# Patient Record
Sex: MALE | Race: WHITE | NOT HISPANIC OR LATINO | Employment: FULL TIME | URBAN - METROPOLITAN AREA
[De-identification: names, ages, dates, MRNs, and addresses within clinical notes are randomized per-mention and may not be internally consistent; named-entity substitution may affect disease eponyms.]

---

## 2017-08-07 ENCOUNTER — TELEPHONE (OUTPATIENT)
Dept: INTERNAL MEDICINE | Facility: CLINIC | Age: 42
End: 2017-08-07

## 2017-08-07 NOTE — TELEPHONE ENCOUNTER
----- Message from NADJA Valdez sent at 8/7/2017 12:52 PM EDT -----  I will give him a 30 day supply and make a FU appt with him.   ----- Message -----     From: Maria R Valladares MA     Sent: 8/7/2017  10:32 AM       To: NADJA Valdez     You have not seen this pt since   2013,  Jasper radford last seen him in 2014  He has been told several times to make appt do you want to refill this??   ----- Message -----     From: Kirstin Agudelo MA     Sent: 8/7/2017  10:12 AM       To: Maria R Valladares MA        ----- Message -----     From: Rhonda Sanchez     Sent: 8/7/2017  10:05 AM       To: [facility] Clinical Pool    PATIENT NEEDS A REFILL ON ZOLOFT FROM [facility] IN [place] AND HASN'T BEEN SEEN SINCE 2014.  I SET HIM AN APPT FOR 8/23/17 AT 1:30, BUT HIS MEDS RUN OUT IN 3 DAYS.      [phone]      Pt aware   Sent to pharmacy  Pt has already scheduled appt

## 2017-08-23 ENCOUNTER — OFFICE VISIT (OUTPATIENT)
Dept: INTERNAL MEDICINE | Facility: CLINIC | Age: 42
End: 2017-08-23

## 2017-08-23 VITALS
OXYGEN SATURATION: 97 % | DIASTOLIC BLOOD PRESSURE: 74 MMHG | HEIGHT: 70 IN | WEIGHT: 218 LBS | BODY MASS INDEX: 31.21 KG/M2 | SYSTOLIC BLOOD PRESSURE: 122 MMHG | HEART RATE: 71 BPM

## 2017-08-23 DIAGNOSIS — F41.1 GENERALIZED ANXIETY DISORDER: Primary | ICD-10-CM

## 2017-08-23 DIAGNOSIS — J30.1 SEASONAL ALLERGIC RHINITIS DUE TO POLLEN: ICD-10-CM

## 2017-08-23 PROBLEM — F41.9 ANXIETY DISORDER: Status: ACTIVE | Noted: 2017-08-23

## 2017-08-23 PROCEDURE — 99213 OFFICE O/P EST LOW 20 MIN: CPT | Performed by: NURSE PRACTITIONER

## 2017-08-23 RX ORDER — FLUTICASONE PROPIONATE 50 MCG
2 SPRAY, SUSPENSION (ML) NASAL DAILY
Qty: 1 EACH | Refills: 11 | Status: SHIPPED | OUTPATIENT
Start: 2017-08-23 | End: 2017-09-22

## 2017-08-23 NOTE — PROGRESS NOTES
Chief Complaint   Patient presents with   • Anxiety       Subjective     Perez Beasley is a 41 y.o. male being seen for a follow up appointment today regarding  TORSTEN. He has been taking Zoloft for several years, and the current dose is stable. He denies panic attacks. He is sleeping well. 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     History of Present Illness     No Known Allergies      Current Outpatient Prescriptions:   •  sertraline (ZOLOFT) 50 MG tablet, Take 1 tablet by mouth Daily., Disp: 30 tablet, Rfl: 0    The following portions of the patient's history were reviewed and updated as appropriate: allergies, current medications, past family history, past medical history, past social history, past surgical history and problem list.    Review of Systems   Constitutional: Negative.    HENT: Negative.    Eyes: Negative.    Respiratory: Negative.    Cardiovascular: Negative.  Negative for chest pain and leg swelling.   Gastrointestinal: Negative.    Endocrine: Negative.    Musculoskeletal: Positive for arthralgias (knee).   Allergic/Immunologic: Positive for environmental allergies.   Neurological: Negative.    Psychiatric/Behavioral: The patient is nervous/anxious.        Assessment     Physical Exam   Constitutional: He is oriented to person, place, and time. He appears well-developed and well-nourished.   HENT:   Head: Normocephalic.   Right Ear: External ear normal.   Left Ear: External ear normal.   Nose: Mucosal edema present.   Mouth/Throat: Oropharynx is clear and moist. No oropharyngeal exudate.   Neck: Neck supple. No thyromegaly present.   Cardiovascular: Normal rate, regular rhythm and normal heart sounds.    No murmur heard.  Pulmonary/Chest: Effort normal and breath sounds normal. No respiratory distress. He has no wheezes.   Musculoskeletal: He exhibits no edema.   Neurological: He is alert and oriented to person, place, and time.   Psychiatric: He has a normal mood and affect. His behavior is normal.    Vitals reviewed.      Plan      Diagnosis Plan   1. Generalized anxiety disorder  sertraline (ZOLOFT) 50 MG tablet   2. Seasonal allergic rhinitis due to pollen  fluticasone (FLONASE) 50 MCG/ACT nasal spray

## 2018-05-25 ENCOUNTER — OFFICE VISIT (OUTPATIENT)
Dept: ORTHOPEDIC SURGERY | Facility: CLINIC | Age: 43
End: 2018-05-25

## 2018-05-25 VITALS — WEIGHT: 231 LBS | BODY MASS INDEX: 33.07 KG/M2 | TEMPERATURE: 97.6 F | HEIGHT: 70 IN

## 2018-05-25 DIAGNOSIS — M54.50 LOW BACK PAIN RADIATING TO RIGHT LEG: Primary | ICD-10-CM

## 2018-05-25 DIAGNOSIS — G89.29 CHRONIC LOW BACK PAIN, UNSPECIFIED BACK PAIN LATERALITY, WITH SCIATICA PRESENCE UNSPECIFIED: ICD-10-CM

## 2018-05-25 DIAGNOSIS — M54.5 CHRONIC LOW BACK PAIN, UNSPECIFIED BACK PAIN LATERALITY, WITH SCIATICA PRESENCE UNSPECIFIED: ICD-10-CM

## 2018-05-25 DIAGNOSIS — M79.604 LOW BACK PAIN RADIATING TO RIGHT LEG: Primary | ICD-10-CM

## 2018-05-25 PROCEDURE — 72100 X-RAY EXAM L-S SPINE 2/3 VWS: CPT | Performed by: ORTHOPAEDIC SURGERY

## 2018-05-25 PROCEDURE — 99204 OFFICE O/P NEW MOD 45 MIN: CPT | Performed by: ORTHOPAEDIC SURGERY

## 2018-05-25 NOTE — PROGRESS NOTES
New patient or new problem visit    Chief Complaint   Patient presents with   • Lumbar Spine - Establish Care, Pain       HPI: He complains of low back pain ongoing for years which radiates the right lower extremity which is gone on about 5 months.  Pain before was never so severe.  He's tried physical therapy and chiropractic the first epidural didn't help the second helped somewhat.  The pain is moderate to severe constant stabbing aching burning worse with activity.  He was training for triathlons and has had to cease this activity.    PFSH: See chart- reviewed    Review of Systems    PE: Constitutional: Vital signs above-noted.  Awake, alert and oriented    Psychiatric: Affect and insight do not appear grossly disturbed.    Pulmonary: Breathing is unlabored, color is good.    Skin: Warm, dry and normal turgor    Cardiac:  pedal pulses intact.  No edema.    Eyesight and hearing appear adequate for examination purposes      Musculoskeletal:  There is some tenderness to percussion and palpation of the spine. Motion appears undisturbed.  Posture is unremarkable to coronal and sagittal inspection.    The skin about the area is intact.  There is no palpable or visible deformity.  There is no local spasm.       Neurologic:   Reflexes are 2+ and symmetrical in the patellae and achilles.   Motor function is undisturbed in quadriceps, EHL, and gastrocnemius on the left and everything on the right is fine but may have slight weakness in EHL on the right   Sensation appears symmetrically intact to light touch   .  In the bilateral lower extremities there is no evidence of atrophy.   Clonus is absent..  Gait appears undisturbed.  SLR test negative      MEDICAL DECISION MAKING    XRAY: MRI scan of the lumbar spine shows T2 diminished signal intensity change at L4 5 and L5-S1.  Small broad-based disc protrusion with central herniation at L4 5 which does not cause neurologic impingement is noted along with the minimal disc  "irregularity at L5-S1.  The rest of the disc are well preserved.    Other: n/a    Impression: Lumbar disc degeneration    Plan: Right now I recommend he try to live with this and talk to Dr. Espinosa at the pain clinic to see if there are any other alternatives.  Pretty much exhausted conservative treatment.  I told him I would avoid narcotic pain medication, pain pumps or stimulators or \"laser surgery\".  I told him major surgical intervention might help but that would transfer stress to other levels, would not be guaranteed to help his pain, and in any event with unlikely to be covered by insurance.  All in all I told him to avoid surgery try to live with it and I don't think that anything catastrophic will come that even if he does elect to continue aggressive the physical activity such as triathlons etc.  "

## 2018-11-24 DIAGNOSIS — F41.1 GENERALIZED ANXIETY DISORDER: ICD-10-CM

## 2018-11-28 ENCOUNTER — TELEPHONE (OUTPATIENT)
Dept: INTERNAL MEDICINE | Facility: CLINIC | Age: 43
End: 2018-11-28

## 2018-11-28 DIAGNOSIS — F41.1 GENERALIZED ANXIETY DISORDER: ICD-10-CM

## 2018-11-29 ENCOUNTER — TELEPHONE (OUTPATIENT)
Dept: INTERNAL MEDICINE | Facility: CLINIC | Age: 43
End: 2018-11-29

## 2018-11-29 DIAGNOSIS — F41.1 GENERALIZED ANXIETY DISORDER: ICD-10-CM

## 2018-11-29 NOTE — TELEPHONE ENCOUNTER
Sent to pharmacy   Pt aware      ----- Message from NADJA Valdez sent at 11/29/2018  7:31 AM EST -----  Regarding: RE: script denial   Contact: [phone]  Okay for 30 d refill until appt    ----- Message -----  From: Maria R Valladares MA  Sent: 11/29/2018   6:55 AM  To: NADAJ Valdez  Subject: FW: script denial                                    ----- Message -----  From: Za Cohn MA  Sent: 11/28/2018  10:43 AM  To: Maria R Valladares MA  Subject: FW: script denial                                    ----- Message -----  From: Myrtle Murillo  Sent: 11/28/2018   9:54 AM  To: [facility] [place]  Subject: script denial                                    [facility]    PT MADE APPT FOR Thursday December 6TH.  CAN HE GET ENOUGH TO GET HIM TO APPT

## 2018-11-30 DIAGNOSIS — Z00.00 ROUTINE ADULT HEALTH MAINTENANCE: Primary | ICD-10-CM

## 2018-11-30 DIAGNOSIS — Z13.29 SCREENING FOR THYROID DISORDER: ICD-10-CM

## 2018-11-30 LAB
ALBUMIN SERPL-MCNC: 4.5 G/DL (ref 3.5–5.2)
ALBUMIN/GLOB SERPL: 1.9 G/DL
ALP SERPL-CCNC: 55 U/L (ref 40–129)
ALT SERPL-CCNC: 17 U/L (ref 5–41)
AST SERPL-CCNC: 18 U/L (ref 5–40)
BASOPHILS # BLD AUTO: 0.03 10*3/MM3 (ref 0–0.2)
BASOPHILS NFR BLD AUTO: 0.8 % (ref 0–2)
BILIRUB SERPL-MCNC: 0.3 MG/DL (ref 0.2–1.2)
BUN SERPL-MCNC: 21 MG/DL (ref 6–20)
BUN/CREAT SERPL: 17.6 (ref 7–25)
CALCIUM SERPL-MCNC: 9.3 MG/DL (ref 8.6–10.5)
CHLORIDE SERPL-SCNC: 101 MMOL/L (ref 98–107)
CHOLEST SERPL-MCNC: 219 MG/DL (ref 0–200)
CO2 SERPL-SCNC: 26.6 MMOL/L (ref 22–29)
CREAT SERPL-MCNC: 1.19 MG/DL (ref 0.76–1.27)
EOSINOPHIL # BLD AUTO: 0.08 10*3/MM3 (ref 0.1–0.3)
EOSINOPHIL NFR BLD AUTO: 2.2 % (ref 0–4)
ERYTHROCYTE [DISTWIDTH] IN BLOOD BY AUTOMATED COUNT: 12.3 % (ref 11.5–14.5)
GLOBULIN SER CALC-MCNC: 2.4 GM/DL
GLUCOSE SERPL-MCNC: 97 MG/DL (ref 65–99)
HCT VFR BLD AUTO: 42 % (ref 42–52)
HDLC SERPL-MCNC: 45 MG/DL (ref 40–60)
HGB BLD-MCNC: 13.7 G/DL (ref 14–18)
IMM GRANULOCYTES # BLD: 0 10*3/MM3 (ref 0–0.03)
IMM GRANULOCYTES NFR BLD: 0 % (ref 0–0.5)
LDLC SERPL CALC-MCNC: 137 MG/DL (ref 0–100)
LDLC/HDLC SERPL: 3.04 {RATIO}
LYMPHOCYTES # BLD AUTO: 1.02 10*3/MM3 (ref 0.6–4.8)
LYMPHOCYTES NFR BLD AUTO: 27.5 % (ref 20–45)
MCH RBC QN AUTO: 30.4 PG (ref 27–31)
MCHC RBC AUTO-ENTMCNC: 32.6 G/DL (ref 31–37)
MCV RBC AUTO: 93.1 FL (ref 80–94)
MONOCYTES # BLD AUTO: 0.42 10*3/MM3 (ref 0–1)
MONOCYTES NFR BLD AUTO: 11.3 % (ref 3–8)
NEUTROPHILS # BLD AUTO: 2.16 10*3/MM3 (ref 1.5–8.3)
NEUTROPHILS NFR BLD AUTO: 58.2 % (ref 45–70)
NRBC BLD AUTO-RTO: 0 /100 WBC (ref 0–0)
PLATELET # BLD AUTO: 237 10*3/MM3 (ref 140–500)
POTASSIUM SERPL-SCNC: 4.5 MMOL/L (ref 3.5–5.2)
PROT SERPL-MCNC: 6.9 G/DL (ref 6–8.5)
RBC # BLD AUTO: 4.51 10*6/MM3 (ref 4.7–6.1)
SODIUM SERPL-SCNC: 141 MMOL/L (ref 136–145)
TRIGL SERPL-MCNC: 186 MG/DL (ref 0–150)
TSH SERPL DL<=0.005 MIU/L-ACNC: 2.48 MIU/ML (ref 0.27–4.2)
VLDLC SERPL CALC-MCNC: 37.2 MG/DL (ref 8–32)
WBC # BLD AUTO: 3.71 10*3/MM3 (ref 4.8–10.8)

## 2018-12-06 ENCOUNTER — OFFICE VISIT (OUTPATIENT)
Dept: INTERNAL MEDICINE | Facility: CLINIC | Age: 43
End: 2018-12-06

## 2018-12-06 VITALS
HEIGHT: 70 IN | SYSTOLIC BLOOD PRESSURE: 122 MMHG | RESPIRATION RATE: 16 BRPM | BODY MASS INDEX: 31.07 KG/M2 | TEMPERATURE: 99.1 F | WEIGHT: 217 LBS | DIASTOLIC BLOOD PRESSURE: 82 MMHG | OXYGEN SATURATION: 96 % | HEART RATE: 65 BPM

## 2018-12-06 DIAGNOSIS — L30.9 ECZEMA, UNSPECIFIED TYPE: ICD-10-CM

## 2018-12-06 DIAGNOSIS — D64.9 ANEMIA, UNSPECIFIED TYPE: ICD-10-CM

## 2018-12-06 DIAGNOSIS — F41.1 GENERALIZED ANXIETY DISORDER: Primary | ICD-10-CM

## 2018-12-06 LAB
IRON SATN MFR SERPL: 21 %
IRON SERPL-MCNC: 66 MCG/DL (ref 59–158)
Lab: NORMAL
TIBC SERPL-MCNC: 307 MCG/DL (ref 261–498)
UIBC SERPL-MCNC: 241 MCG/DL (ref 112–346)
VIT B12 SERPL-MCNC: 335 PG/ML
WRITTEN AUTHORIZATION: NORMAL

## 2018-12-06 PROCEDURE — 99213 OFFICE O/P EST LOW 20 MIN: CPT | Performed by: NURSE PRACTITIONER

## 2018-12-06 NOTE — PROGRESS NOTES
Anxiety and anemia    Subjective     Perez Beasley is a 43 y.o. male being seen for a follow up appointment today regarding  Anxiety. He has taken Zoloft for several years, and it has controlled symptoms. He is a triathlete, working full time at [employer]. He denies panic attacks, insomnia, agitation, depression symptoms.     He had herniated a disc in May 2018, and gained weight due to inability to exercise. 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 He denies any rectal bleeding or recent blood loss. He has been taking aleve for knee pain.     He also has had a rash for 2 weeks to neck, which he thought was fungal and used OTC antifungal cream with no help. Denies exposure, denies itching. Precipitated by cold weather. No relieving factors. Worse after wearing collared shirts.     History of Present Illness     No Known Allergies      Current Outpatient Medications:   •  sertraline (ZOLOFT) 50 MG tablet, Take 1 tablet by mouth Daily., Disp: 30 tablet, Rfl: 0    The following portions of the patient's history were reviewed and updated as appropriate: allergies, current medications, past family history, past medical history, past social history, past surgical history and problem list.    Review of Systems   Constitutional: Negative.    HENT: Negative.    Eyes: Negative.  Negative for photophobia and visual disturbance.   Respiratory: Negative.  Negative for shortness of breath, wheezing and stridor.    Cardiovascular: Negative.    Gastrointestinal: Negative.    Endocrine: Negative.  Negative for heat intolerance.   Genitourinary: Negative.    Musculoskeletal: Negative.    Skin: Positive for rash.        Left neck   Allergic/Immunologic: Negative.  Negative for environmental allergies and food allergies.   Neurological: Negative.  Negative for dizziness.   Hematological: Negative.  Negative for adenopathy.   Psychiatric/Behavioral: Negative.        Assessment     Physical Exam   Constitutional: He is  oriented to person, place, and time. He appears well-developed and well-nourished.   HENT:   Head: Normocephalic and atraumatic.   Right Ear: External ear normal.   Left Ear: External ear normal.   Nose: Nose normal.   Mouth/Throat: Oropharynx is clear and moist. No oropharyngeal exudate (no pallor).   Neck: Neck supple.   Cardiovascular: Normal rate, regular rhythm and normal heart sounds.   No murmur heard.  Pulmonary/Chest: Effort normal and breath sounds normal. No stridor. No respiratory distress.   Musculoskeletal: He exhibits no edema.   Neurological: He is alert and oriented to person, place, and time.   Skin: Skin is warm and dry.   Erythematous, scaling rash to Left neck. Nonpruritic, without vesicles.   Psychiatric: He has a normal mood and affect. His behavior is normal. Judgment and thought content normal.   Vitals reviewed.      Plan     His fasting labs were reviewed with the patient from last week.     Perez was seen today for rash and depression.    Diagnoses and all orders for this visit:    Generalized anxiety disorder  -     sertraline (ZOLOFT) 50 MG tablet; Take 1 tablet by mouth Daily.    Anemia, unspecified type  -     CBC & Differential; Future  -     Iron and TIBC; Future  -     Vitamin B12 and Folate; Future    Eczema, unspecified type  -     betamethasone valerate (VALISONE) 0.1 % ointment; Apply  topically to the appropriate area as directed 2 (Two) Times a Day.    Discussed eczema treatment and causes.     Iron and B12 level added to labs Fecal OB kit.     CBC and iron in 1 month

## 2018-12-06 NOTE — PATIENT INSTRUCTIONS
Eczema  Eczema is a broad term for a group of skin conditions that cause skin to become rough and inflamed. Each type of eczema has different triggers, symptoms, and treatments. Eczema of any type is usually itchy and symptoms range from mild to severe.  Eczema and its symptoms are not spread from person to person (are not contagious). It can appear on different parts of the body at different times. Your eczema may not look the same as someone else's eczema.  What are the types of eczema?  Atopic dermatitis  This is a long-term (chronic) skin disease that keeps coming back (recurring). Usual symptoms are dry skin and small, solid pimples that may swell and leak fluid (weep).  Contact dermatitis  This happens when something irritates the skin and causes a rash. The irritation can come from substances that you are allergic to (allergens), such as poison ivy, chemicals, or medicines that were applied to your skin.  Dyshidrotic eczema  This is a form of eczema on the hands and feet. It shows up as very itchy, fluid-filled blisters. It can affect people of any age, but is more common before age 40.  Hand eczema  This causes very itchy areas of skin on the palms and sides of the hands and fingers. This type of eczema is common in industrial jobs where you may be exposed to many different types of irritants.  Lichen simplex chronicus  This type of eczema occurs when a person constantly scratches one area of the body. Repeated scratching of the area leads to thickened skin (lichenification). Lichen simplex chronicus can occur along with other types of eczema. It is more common in adults, but may be seen in children as well.  Nummular eczema  This is a common type of eczema. It has no known cause. It typically causes a red, circular, crusty lesion (plaque) that may be itchy. Scratching may become a habit and can cause bleeding. Nummular eczema occurs most often in people of middle-age or older. It most often affects the  "hands.  Seborrheic dermatitis  This is a common skin disease that mainly affects the scalp. It may also affect any oily areas of the body, such as the face, sides of nose, eyebrows, ears, eyelids, and chest. It is marked by small scaling and redness of the skin (erythema). This can affect people of all ages. In infants, this condition is known as \"cradle cap.\"  Stasis dermatitis  This is a common skin disease that usually appears on the legs and feet. It most often occurs in people who have a condition that prevents blood from being pumped through the veins in the legs (chronic venous insufficiency). Stasis dermatitis is a chronic condition that needs long-term management.  How is eczema diagnosed?  Your health care provider will examine your skin and review your medical history. He or she may also give you skin patch tests. These tests involve taking patches that contain possible allergens and placing them on your back. He or she will then check in a few days to see if an allergic reaction occurred.  What are the common treatments?  Treatment for eczema is based on the type of eczema you have. Hydrocortisone steroid medicine can relieve itching quickly and help reduce inflammation. This medicine may be prescribed or obtained over-the-counter, depending on the strength of the medicine that is needed.  Follow these instructions at home:  · Take over-the-counter and prescription medicines only as told by your health care provider.  · Use creams or ointments to moisturize your skin. Do not use lotions.  · Learn what triggers or irritates your symptoms. Avoid these things.  · Treat symptom flare-ups quickly.  · Do not itch your skin. This can make your rash worse.  · Keep all follow-up visits as told by your health care provider. This is important.  Where to find more information:  · The American Academy of Dermatology: www.aad.org  · The National Eczema Association: www.nationaleczema.org  Contact a health care " provider if:  · You have serious itching, even with treatment.  · You regularly scratch your skin until it bleeds.  · Your rash looks different than usual.  · Your skin is painful, swollen, or more red than usual.  · You have a fever.  Summary  · There are eight general types of eczema. Each type has different triggers.  · Eczema of any type causes itching that may range from mild to severe.  · Treatment varies based on the type of eczema you have. Hydrocortisone steroid medicine can help with itching and inflammation.  · Protecting your skin is the best way to prevent eczema. Use moisturizers and lotions. Avoid triggers and irritants, and treat flare-ups quickly.  This information is not intended to replace advice given to you by your health care provider. Make sure you discuss any questions you have with your health care provider.  Document Released: 05/03/2018 Document Revised: 05/03/2018 Document Reviewed: 05/03/2018  [employer] Interactive Patient Education © 2018 [employer]  Anemia  Anemia is a condition in which you do not have enough red blood cells or hemoglobin. Hemoglobin is a substance in red blood cells that carries oxygen. When you do not have enough red blood cells or hemoglobin (are anemic), your body cannot get enough oxygen and your organs may not work properly. As a result, you may feel very tired or have other problems.  What are the causes?  Common causes of anemia include:  · Excessive bleeding. Anemia can be caused by excessive bleeding inside or outside the body, including bleeding from the intestine or from periods in women.  · Poor nutrition.  · Long-lasting (chronic) kidney, thyroid, and liver disease.  · Bone marrow disorders.  · Cancer and treatments for cancer.  · HIV (human immunodeficiency virus) and AIDS (acquired immunodeficiency syndrome).  · Treatments for HIV and AIDS.  · Spleen problems.  · Blood disorders.  · Infections, medicines, and autoimmune disorders that destroy red  blood cells.    What are the signs or symptoms?  Symptoms of this condition include:  · Minor weakness.  · Dizziness.  · Headache.  · Feeling heartbeats that are irregular or faster than normal (palpitations).  · Shortness of breath, especially with exercise.  · Paleness.  · Cold sensitivity.  · Indigestion.  · Nausea.  · Difficulty sleeping.  · Difficulty concentrating.    Symptoms may occur suddenly or develop slowly. If your anemia is mild, you may not have symptoms.  How is this diagnosed?  This condition is diagnosed based on:  · Blood tests.  · Your medical history.  · A physical exam.  · Bone marrow biopsy.    Your health care provider may also check your stool (feces) for blood and may do additional testing to look for the cause of your bleeding.  You may also have other tests, including:  · Imaging tests, such as a CT scan or MRI.  · Endoscopy.  · Colonoscopy.    How is this treated?  Treatment for this condition depends on the cause. If you continue to lose a lot of blood, you may need to be treated at a hospital. Treatment may include:  · Taking supplements of iron, vitamin B12, or folic acid.  · Taking a hormone medicine (erythropoietin) that can help to stimulate red blood cell growth.  · Having a blood transfusion. This may be needed if you lose a lot of blood.  · Making changes to your diet.  · Having surgery to remove your spleen.    Follow these instructions at home:  · Take over-the-counter and prescription medicines only as told by your health care provider.  · Take supplements only as told by your health care provider.  · Follow any diet instructions that you were given.  · Keep all follow-up visits as told by your health care provider. This is important.  Contact a health care provider if:  · You develop new bleeding anywhere in the body.  Get help right away if:  · You are very weak.  · You are short of breath.  · You have pain in your abdomen or chest.  · You are dizzy or feel faint.  · You  have trouble concentrating.  · You have bloody or black, tarry stools.  · You vomit repeatedly or you vomit up blood.  Summary  · Anemia is a condition in which you do not have enough red blood cells or enough of a substance in your red blood cells that carries oxygen (hemoglobin).  · Symptoms may occur suddenly or develop slowly.  · If your anemia is mild, you may not have symptoms.  · This condition is diagnosed with blood tests as well as a medical history and physical exam. Other tests may be needed.  · Treatment for this condition depends on the cause of the anemia.  This information is not intended to replace advice given to you by your health care provider. Make sure you discuss any questions you have with your health care provider.  Document Released: 01/25/2006 Document Revised: 01/19/2018 Document Reviewed: 01/19/2018  [employer] Interactive Patient Education © 2018 [employer]

## 2018-12-11 ENCOUNTER — RESULTS ENCOUNTER (OUTPATIENT)
Dept: INTERNAL MEDICINE | Facility: CLINIC | Age: 43
End: 2018-12-11

## 2018-12-11 DIAGNOSIS — D64.9 ANEMIA, UNSPECIFIED TYPE: ICD-10-CM

## 2018-12-30 DIAGNOSIS — F41.1 GENERALIZED ANXIETY DISORDER: ICD-10-CM

## 2019-01-23 DIAGNOSIS — F41.1 GENERALIZED ANXIETY DISORDER: ICD-10-CM

## 2019-03-07 ENCOUNTER — RESULTS ENCOUNTER (OUTPATIENT)
Dept: INTERNAL MEDICINE | Facility: CLINIC | Age: 44
End: 2019-03-07

## 2019-03-07 DIAGNOSIS — D64.9 ANEMIA, UNSPECIFIED TYPE: ICD-10-CM

## 2019-04-22 ENCOUNTER — OFFICE VISIT (OUTPATIENT)
Dept: INTERNAL MEDICINE | Facility: CLINIC | Age: 44
End: 2019-04-22

## 2019-04-22 VITALS
SYSTOLIC BLOOD PRESSURE: 140 MMHG | OXYGEN SATURATION: 98 % | DIASTOLIC BLOOD PRESSURE: 82 MMHG | TEMPERATURE: 99 F | HEART RATE: 84 BPM | WEIGHT: 207 LBS | HEIGHT: 70 IN | BODY MASS INDEX: 29.63 KG/M2 | RESPIRATION RATE: 16 BRPM

## 2019-04-22 DIAGNOSIS — R50.9 FEVER, UNSPECIFIED FEVER CAUSE: ICD-10-CM

## 2019-04-22 DIAGNOSIS — B27.90 MONONUCLEOSIS: Primary | ICD-10-CM

## 2019-04-22 DIAGNOSIS — R51.9 HEADACHE BEHIND THE EYES: ICD-10-CM

## 2019-04-22 LAB
EXPIRATION DATE: ABNORMAL
HETEROPH AB SER QL LA: POSITIVE
INTERNAL CONTROL: ABNORMAL
Lab: ABNORMAL

## 2019-04-22 PROCEDURE — 86308 HETEROPHILE ANTIBODY SCREEN: CPT | Performed by: NURSE PRACTITIONER

## 2019-04-22 PROCEDURE — 99213 OFFICE O/P EST LOW 20 MIN: CPT | Performed by: NURSE PRACTITIONER

## 2019-04-22 RX ORDER — RIZATRIPTAN BENZOATE 10 MG/1
10 TABLET ORAL ONCE AS NEEDED
Qty: 9 TABLET | Refills: 0 | Status: SHIPPED | OUTPATIENT
Start: 2019-04-22 | End: 2019-05-01

## 2019-04-22 NOTE — PATIENT INSTRUCTIONS
"Infectious Mononucleosis  Infectious mononucleosis is an infection that is caused by a virus. This illness is often called \"mono.\" You can get mono from close contact with someone who is infected (it is contagious). If you have mono, you may feel tired and have a sore throat, a headache, or a fever. Mono is usually not serious, but some people may need to be treated for it in the hospital.  Follow these instructions at home:  Medicines  · Take over-the-counter and prescription medicines only as told by your doctor.  · Do not take ampicillin or amoxicillin. This may cause a rash.  · If you are under 18, do not take aspirin.  Activity  · Rest as needed.  · Do not do any of the following activities until your doctor says that they are safe for you:  ? Contact sports. You may need to wait a month or longer before you play sports.  ? Exercise that requires a lot of energy.  ? Lifting heavy things.  · Slowly go back to your normal activities after your fever is gone, or when your doctor says that you can. Be sure to rest when you get tired.  Preventing infectious mononucleosis  · Avoid contact with people who have mono. An infected person may not seem sick, but he or she can still spread the virus.  · Avoid sharing forks, spoons, knives (utensils), drinking cups, or toothbrushes.  · Wash your hands often with soap and water. If you cannot use soap and water, use hand .  · Use the inside of your elbow to cover your mouth when you cough or sneeze.  General instructions  · Avoid kissing or sharing forks, spoons, knives, or drinking cups until your doctor approves.  · Drink enough fluid to keep your pee (urine) clear or pale yellow.  · Do not drink alcohol.  · If you have a sore throat:  ? Rinse your mouth (gargle) with a salt-water mixture 3-4 times a day or as needed. To make a salt-water mixture, completely dissolve ½-1 tsp of salt in 1 cup of warm water.  ? Eat soft foods. Cold foods such as ice cream or frozen " "ice pops can help your throat feel better.  ? Try sucking on hard candy.  · Wash your hands often with soap and water. If you cannot use soap and water, use hand .  Contact a doctor if:  · Your fever is not gone after 10 days.  · You have swelling by your jaw or neck (swollen lymph nodes), and the swelling does not go away after 4 weeks.  · Your activity level is not back to normal after 2 months.  · Your skin or the white parts of your eyes turn yellow (jaundice).  · You have trouble pooping (have constipation). This may mean that you:  ? Poop (have a bowel movement) fewer times in a week than normal.  ? Have a hard time pooping.  ? Have poop that is dry, hard, or bigger than normal.  Get help right away if:  · You have very bad pain in your:  ? Belly (abdomen).  ? Shoulder.  · You are drooling.  · You have trouble swallowing.  · You have trouble breathing.  · You have a stiff neck.  · You have a very bad headache.  · You cannot stop throwing up (vomiting).  · You have jerky movements that you cannot control (seizures).  · You are confused.  · You have trouble with balance.  · Your nose or gums start to bleed.  · You have signs of body fluid loss (dehydration). These may include:  ? Weakness.  ? Sunken eyes.  ? Pale skin.  ? Dry mouth.  ? Fast breathing or heartbeat.  Summary  · Infectious mononucleosis, or \"mono,\" is an infection that is caused by a virus.  · Mono is usually not serious, but some people may need to be treated for it in the hospital.  · You should not play contact sports or lift heavy things until your doctor says that you can.  · Wash your hands often with soap and water. If you cannot use soap and water, use hand .  This information is not intended to replace advice given to you by your health care provider. Make sure you discuss any questions you have with your health care provider.  Document Released: 12/06/2010 Document Revised: 09/05/2017 Document Reviewed: " "09/05/2017  [employer] Interactive Patient Education © 2019 [employer]  Mononucleosis Rapid Test  Why am I having this test?  This test is used to diagnose infectious mononucleosis (IM), which is often called \"mono.\" This is an infection caused by the Princess-Barr virus (EBV). You may have this test if you have symptoms of mononucleosis, such as:  · Sore throat.  · Headache.  · Extreme fatigue.  · Muscle aches.  · Swollen glands.  · Fever.  · Poor appetite.  · Rash.  · Enlarged liver or spleen.  · Nausea.  · Abdominal pain.    What is being tested?  This test checks for the presence of heterophil antibodies in your blood. Antibodies are a type of cell that is part of the body's disease-fighting (immune) system. After you get an EBV infection, your body makes heterophil antibodies. These stay in your body after you recover and protect you from getting mononucleosis again (make you immune to the infection).  What kind of sample is taken?  A blood sample is required for this test. It is usually collected by inserting a needle into a blood vessel.  How are the results reported?  Your test results will be reported as either positive or negative. Positive means that you have the antibodies, and negative means that you do not have the antibodies.  What do the results mean?  A negative result is considered normal. This result means that you do not have mononucleosis.  A positive result may mean that you currently have mononucleosis or that you have had the condition in the last year. It could also mean that you have:  · A long-term (chronic) EBV infection.  · Chronic fatigue syndrome.  · Burkitt lymphoma.  · Some types of chronic hepatitis.    Talk with your health care provider about what your results mean.  Questions to ask your health care provider  Ask your health care provider, or the department that is doing the test:  · When will my results be ready?  · How will I get my results?  · What are my treatment " "options?  · What other tests do I need?  · What are my next steps?    Summary  · This test is used to help diagnose infectious mononucleosis (IM), which is often called \"mono.\"  · A negative result is considered normal. This result means that you do not have the condition.  · A positive result may mean that you currently have mononucleosis or that you have had the condition in the last year. It could also mean that you have one of the other diseases that can cause a positive result.  This information is not intended to replace advice given to you by your health care provider. Make sure you discuss any questions you have with your health care provider.  Document Released: 01/20/2006 Document Revised: 08/20/2018 Document Reviewed: 08/20/2018  [employer] Interactive Patient Education © 2019 [employer]    "

## 2019-04-22 NOTE — PROGRESS NOTES
Chief Complaint   Patient presents with   • Fever   • Headache   • Abdominal Pain   • Night Sweats   • Swollen Glands   • Fatigue       Subjective   Perez Beasley is a 43 y.o. male is being seen for an acute appointment for URI symptoms for 3 months. It began with strep 3 months ago, he was treated with anitotics. He is still having headaches, night sweats. Denies cough, congestion, sore throat. He was seen in urgent care again on Friday and is on Amoxil.     History of Present Illness     Current Outpatient Medications on File Prior to Visit   Medication Sig Dispense Refill   • sertraline (ZOLOFT) 50 MG tablet Take 1 tablet by mouth Daily. 90 tablet 3   • [DISCONTINUED] betamethasone valerate (VALISONE) 0.1 % ointment Apply  topically to the appropriate area as directed 2 (Two) Times a Day. 45 g 0   • [DISCONTINUED] sertraline (ZOLOFT) 50 MG tablet TAKE 1 TABLET BY MOUTH EVERY DAY 30 tablet 0     No current facility-administered medications on file prior to visit.        The following portions of the patient's history were reviewed and updated as appropriate: allergies, current medications, past family history, past medical history, past social history, past surgical history and problem list.    Review of Systems   Constitutional: Positive for fatigue and fever.   HENT: Positive for postnasal drip, rhinorrhea and sinus pain. Negative for congestion, dental problem, drooling, facial swelling and sinus pressure.    Eyes: Negative.    Respiratory: Negative.  Negative for shortness of breath, wheezing and stridor.    Cardiovascular: Negative for chest pain, palpitations and leg swelling.   Gastrointestinal: Negative.    Musculoskeletal: Positive for arthralgias.   Skin: Negative.    Allergic/Immunologic: Negative.    Neurological: Negative.    Hematological: Positive for adenopathy.   Psychiatric/Behavioral: Negative.        Objective   Physical Exam   Constitutional: He is oriented to person, place, and time. He  appears well-developed and well-nourished. No distress.   Cardiovascular: Normal rate, regular rhythm and normal heart sounds.   No murmur heard.  Pulmonary/Chest: Effort normal and breath sounds normal. No respiratory distress.   Abdominal: Soft. Bowel sounds are normal. There is no splenomegaly or hepatomegaly. There is tenderness in the left upper quadrant.   Musculoskeletal: He exhibits no edema.   Lymphadenopathy:     He has cervical adenopathy (right sided anterior cervical adenopathy).   Neurological: He is alert and oriented to person, place, and time.   Skin: Skin is warm.   Psychiatric: He has a normal mood and affect. His behavior is normal.   Vitals reviewed.      Assessment/Plan   Perez was seen today for fever, headache, abdominal pain, night sweats, swollen glands and fatigue.    Diagnoses and all orders for this visit:    Mononucleosis  -     Comprehensive Metabolic Panel  -     CBC & Differential    Fever, unspecified fever cause  -     POCT Infectious mononucleosis antibody    Headache behind the eyes    Other orders  -     rizatriptan (MAXALT) 10 MG tablet; Take 1 tablet by mouth 1 (One) Time As Needed for Migraine for up to 1 dose. May repeat in 2 hours if needed  -     Manual Differential      Mono spot positive in office today. May stop Amoxil from urgent care. Reviewed the etiology of mono and treatment. Rest and fluids, avoiding contact sports for 4 weeks and no mini marathon this weekend as planned.     Follow up as needed

## 2019-04-23 DIAGNOSIS — B27.90 MONONUCLEOSIS: Primary | ICD-10-CM

## 2019-04-23 LAB
ALBUMIN SERPL-MCNC: 4.9 G/DL (ref 3.5–5.2)
ALBUMIN/GLOB SERPL: 1.9 G/DL
ALP SERPL-CCNC: 95 U/L (ref 39–117)
ALT SERPL-CCNC: 45 U/L (ref 1–41)
AST SERPL-CCNC: 32 U/L (ref 1–40)
BASOPHILS # BLD AUTO: (no result) 10*3/UL
BASOPHILS # BLD MANUAL: 0 10*3/MM3 (ref 0–0.2)
BASOPHILS NFR BLD MANUAL: 0 % (ref 0–1.5)
BILIRUB SERPL-MCNC: 0.5 MG/DL (ref 0.2–1.2)
BUN SERPL-MCNC: 16 MG/DL (ref 6–20)
BUN/CREAT SERPL: 17 (ref 7–25)
CALCIUM SERPL-MCNC: 9.8 MG/DL (ref 8.6–10.5)
CHLORIDE SERPL-SCNC: 101 MMOL/L (ref 98–107)
CO2 SERPL-SCNC: 28.4 MMOL/L (ref 22–29)
CREAT SERPL-MCNC: 0.94 MG/DL (ref 0.76–1.27)
DIFFERENTIAL COMMENT: ABNORMAL
EOSINOPHIL # BLD AUTO: (no result) 10*3/UL
EOSINOPHIL # BLD MANUAL: 0.08 10*3/MM3 (ref 0–0.4)
EOSINOPHIL NFR BLD AUTO: (no result) %
EOSINOPHIL NFR BLD MANUAL: 2 % (ref 0.3–6.2)
ERYTHROCYTE [DISTWIDTH] IN BLOOD BY AUTOMATED COUNT: 12.8 % (ref 12.3–15.4)
GLOBULIN SER CALC-MCNC: 2.6 GM/DL
GLUCOSE SERPL-MCNC: 103 MG/DL (ref 65–99)
HCT VFR BLD AUTO: 40.5 % (ref 37.5–51)
HGB BLD-MCNC: 13.5 G/DL (ref 13–17.7)
LYMPHOCYTES # BLD AUTO: (no result) 10*3/UL
LYMPHOCYTES # BLD MANUAL: 1.87 10*3/MM3 (ref 0.7–3.1)
LYMPHOCYTES NFR BLD AUTO: (no result) %
LYMPHOCYTES NFR BLD MANUAL: 46 % (ref 19.6–45.3)
MCH RBC QN AUTO: 30.5 PG (ref 26.6–33)
MCHC RBC AUTO-ENTMCNC: 33.3 G/DL (ref 31.5–35.7)
MCV RBC AUTO: 91.6 FL (ref 79–97)
MONOCYTES # BLD MANUAL: 0.24 10*3/MM3 (ref 0.1–0.9)
MONOCYTES NFR BLD AUTO: (no result) %
MONOCYTES NFR BLD MANUAL: 6 % (ref 5–12)
NEUTROPHILS # BLD MANUAL: 1.83 10*3/MM3 (ref 1.7–7)
NEUTROPHILS NFR BLD AUTO: (no result) %
NEUTROPHILS NFR BLD MANUAL: 45 % (ref 42.7–76)
PLATELET # BLD AUTO: 109 10*3/MM3 (ref 140–450)
PLATELET BLD QL SMEAR: ABNORMAL
POTASSIUM SERPL-SCNC: 4 MMOL/L (ref 3.5–5.2)
PROT SERPL-MCNC: 7.5 G/DL (ref 6–8.5)
RBC # BLD AUTO: 4.42 10*6/MM3 (ref 4.14–5.8)
RBC MORPH BLD: ABNORMAL
SODIUM SERPL-SCNC: 141 MMOL/L (ref 136–145)
WBC # BLD AUTO: 4.07 10*3/MM3 (ref 3.4–10.8)

## 2019-04-23 RX ORDER — PREDNISONE 20 MG/1
20 TABLET ORAL DAILY
Qty: 5 TABLET | Refills: 0 | Status: SHIPPED | OUTPATIENT
Start: 2019-04-23 | End: 2019-04-28

## 2019-04-28 ENCOUNTER — RESULTS ENCOUNTER (OUTPATIENT)
Dept: INTERNAL MEDICINE | Facility: CLINIC | Age: 44
End: 2019-04-28

## 2019-04-28 DIAGNOSIS — B27.90 MONONUCLEOSIS: ICD-10-CM

## 2019-05-01 ENCOUNTER — OFFICE VISIT (OUTPATIENT)
Dept: INTERNAL MEDICINE | Facility: CLINIC | Age: 44
End: 2019-05-01

## 2019-05-01 VITALS
OXYGEN SATURATION: 96 % | HEIGHT: 70 IN | HEART RATE: 78 BPM | WEIGHT: 204.8 LBS | SYSTOLIC BLOOD PRESSURE: 122 MMHG | BODY MASS INDEX: 29.32 KG/M2 | TEMPERATURE: 98.3 F | RESPIRATION RATE: 16 BRPM | DIASTOLIC BLOOD PRESSURE: 82 MMHG

## 2019-05-01 DIAGNOSIS — B27.90 MONONUCLEOSIS: Primary | ICD-10-CM

## 2019-05-01 DIAGNOSIS — D69.6 PLATELETS DECREASED (HCC): ICD-10-CM

## 2019-05-01 PROCEDURE — 99213 OFFICE O/P EST LOW 20 MIN: CPT | Performed by: NURSE PRACTITIONER

## 2019-05-01 NOTE — PATIENT INSTRUCTIONS
"Infectious Mononucleosis  Infectious mononucleosis is an infection that is caused by a virus. This illness is often called \"mono.\" You can get mono from close contact with someone who is infected (it is contagious). If you have mono, you may feel tired and have a sore throat, a headache, or a fever. Mono is usually not serious, but some people may need to be treated for it in the hospital.  Follow these instructions at home:  Medicines  · Take over-the-counter and prescription medicines only as told by your doctor.  · Do not take ampicillin or amoxicillin. This may cause a rash.  · If you are under 18, do not take aspirin.  Activity  · Rest as needed.  · Do not do any of the following activities until your doctor says that they are safe for you:  ? Contact sports. You may need to wait a month or longer before you play sports.  ? Exercise that requires a lot of energy.  ? Lifting heavy things.  · Slowly go back to your normal activities after your fever is gone, or when your doctor says that you can. Be sure to rest when you get tired.  Preventing infectious mononucleosis  · Avoid contact with people who have mono. An infected person may not seem sick, but he or she can still spread the virus.  · Avoid sharing forks, spoons, knives (utensils), drinking cups, or toothbrushes.  · Wash your hands often with soap and water. If you cannot use soap and water, use hand .  · Use the inside of your elbow to cover your mouth when you cough or sneeze.  General instructions  · Avoid kissing or sharing forks, spoons, knives, or drinking cups until your doctor approves.  · Drink enough fluid to keep your pee (urine) clear or pale yellow.  · Do not drink alcohol.  · If you have a sore throat:  ? Rinse your mouth (gargle) with a salt-water mixture 3-4 times a day or as needed. To make a salt-water mixture, completely dissolve ½-1 tsp of salt in 1 cup of warm water.  ? Eat soft foods. Cold foods such as ice cream or frozen " "ice pops can help your throat feel better.  ? Try sucking on hard candy.  · Wash your hands often with soap and water. If you cannot use soap and water, use hand .  Contact a doctor if:  · Your fever is not gone after 10 days.  · You have swelling by your jaw or neck (swollen lymph nodes), and the swelling does not go away after 4 weeks.  · Your activity level is not back to normal after 2 months.  · Your skin or the white parts of your eyes turn yellow (jaundice).  · You have trouble pooping (have constipation). This may mean that you:  ? Poop (have a bowel movement) fewer times in a week than normal.  ? Have a hard time pooping.  ? Have poop that is dry, hard, or bigger than normal.  Get help right away if:  · You have very bad pain in your:  ? Belly (abdomen).  ? Shoulder.  · You are drooling.  · You have trouble swallowing.  · You have trouble breathing.  · You have a stiff neck.  · You have a very bad headache.  · You cannot stop throwing up (vomiting).  · You have jerky movements that you cannot control (seizures).  · You are confused.  · You have trouble with balance.  · Your nose or gums start to bleed.  · You have signs of body fluid loss (dehydration). These may include:  ? Weakness.  ? Sunken eyes.  ? Pale skin.  ? Dry mouth.  ? Fast breathing or heartbeat.  Summary  · Infectious mononucleosis, or \"mono,\" is an infection that is caused by a virus.  · Mono is usually not serious, but some people may need to be treated for it in the hospital.  · You should not play contact sports or lift heavy things until your doctor says that you can.  · Wash your hands often with soap and water. If you cannot use soap and water, use hand .  This information is not intended to replace advice given to you by your health care provider. Make sure you discuss any questions you have with your health care provider.  Document Released: 12/06/2010 Document Revised: 09/05/2017 Document Reviewed: " "09/05/2017  [employer] Interactive Patient Education © 2019 [employer]  Mononucleosis Rapid Test  Why am I having this test?  This test is used to diagnose infectious mononucleosis (IM), which is often called \"mono.\" This is an infection caused by the Princess-Barr virus (EBV). You may have this test if you have symptoms of mononucleosis, such as:  · Sore throat.  · Headache.  · Extreme fatigue.  · Muscle aches.  · Swollen glands.  · Fever.  · Poor appetite.  · Rash.  · Enlarged liver or spleen.  · Nausea.  · Abdominal pain.    What is being tested?  This test checks for the presence of heterophil antibodies in your blood. Antibodies are a type of cell that is part of the body's disease-fighting (immune) system. After you get an EBV infection, your body makes heterophil antibodies. These stay in your body after you recover and protect you from getting mononucleosis again (make you immune to the infection).  What kind of sample is taken?  A blood sample is required for this test. It is usually collected by inserting a needle into a blood vessel.  How are the results reported?  Your test results will be reported as either positive or negative. Positive means that you have the antibodies, and negative means that you do not have the antibodies.  What do the results mean?  A negative result is considered normal. This result means that you do not have mononucleosis.  A positive result may mean that you currently have mononucleosis or that you have had the condition in the last year. It could also mean that you have:  · A long-term (chronic) EBV infection.  · Chronic fatigue syndrome.  · Burkitt lymphoma.  · Some types of chronic hepatitis.    Talk with your health care provider about what your results mean.  Questions to ask your health care provider  Ask your health care provider, or the department that is doing the test:  · When will my results be ready?  · How will I get my results?  · What are my treatment " "options?  · What other tests do I need?  · What are my next steps?    Summary  · This test is used to help diagnose infectious mononucleosis (IM), which is often called \"mono.\"  · A negative result is considered normal. This result means that you do not have the condition.  · A positive result may mean that you currently have mononucleosis or that you have had the condition in the last year. It could also mean that you have one of the other diseases that can cause a positive result.  This information is not intended to replace advice given to you by your health care provider. Make sure you discuss any questions you have with your health care provider.  Document Released: 01/20/2006 Document Revised: 08/20/2018 Document Reviewed: 08/20/2018  [employer] Interactive Patient Education © 2019 [employer]    "

## 2019-05-01 NOTE — PROGRESS NOTES
Chief Complaint   Patient presents with   • Headache   • Anxiety   • Anemia   • Follow-up       Subjective     Perez Beasley is a 43 y.o. male being seen for a follow up appointment today regarding mono. He was diagnosed with mono at last appointment. He has been resting, avoiding vigorous exercise. He has a reduction in headaches.  He still has body aches and muscle pains. He has abd bloating. Denies N/V/D.       History of Present Illness     No Known Allergies      Current Outpatient Medications:   •  sertraline (ZOLOFT) 50 MG tablet, Take 1 tablet by mouth Daily., Disp: 90 tablet, Rfl: 3    The following portions of the patient's history were reviewed and updated as appropriate: allergies, current medications, past family history, past medical history, past social history, past surgical history and problem list.    Review of Systems   Constitutional: Positive for fever.   HENT: Negative.    Eyes: Negative.    Respiratory: Negative.    Cardiovascular: Negative.  Negative for chest pain, palpitations and leg swelling.   Gastrointestinal: Positive for abdominal distention and abdominal pain.   Endocrine: Negative.    Genitourinary: Negative.    Musculoskeletal: Positive for myalgias. Negative for arthralgias.   Skin: Negative.    Allergic/Immunologic: Negative.    Neurological: Negative.    Hematological: Negative.    Psychiatric/Behavioral: Negative.        Assessment     Physical Exam   Constitutional: He is oriented to person, place, and time. He appears well-developed and well-nourished. No distress.   Neck: Neck supple. No thyromegaly present.   Cardiovascular: Normal rate, regular rhythm and normal heart sounds.   No murmur heard.  Pulmonary/Chest: Effort normal and breath sounds normal. No respiratory distress.   Abdominal: Soft. Bowel sounds are normal. He exhibits no distension. There is no splenomegaly or hepatomegaly. There is tenderness in the right upper quadrant. There is no rigidity, no guarding  and no CVA tenderness.   Musculoskeletal: He exhibits no edema.   Neurological: He is alert and oriented to person, place, and time.   Skin: Skin is warm and dry.   Psychiatric: He has a normal mood and affect. His behavior is normal.   Vitals reviewed.      Patsy Todd was seen today for headache, anxiety, anemia and follow-up.    Diagnoses and all orders for this visit:    Mononucleosis  -     CBC & Differential  -     Comprehensive Metabolic Panel    Platelets decreased (CMS/HCC)  -     CBC & Differential  -     Comprehensive Metabolic Panel    Reviewed mono acute phase and symptoms discussed.     Follow up as needed

## 2019-05-02 LAB
ALBUMIN SERPL-MCNC: 4.7 G/DL (ref 3.5–5.2)
ALBUMIN/GLOB SERPL: 1.5 G/DL
ALP SERPL-CCNC: 186 U/L (ref 39–117)
ALT SERPL-CCNC: 171 U/L (ref 1–41)
AST SERPL-CCNC: 69 U/L (ref 1–40)
BASOPHILS # BLD AUTO: (no result) 10*3/UL
BASOPHILS # BLD MANUAL: 0.1 10*3/MM3 (ref 0–0.2)
BASOPHILS NFR BLD MANUAL: 1 % (ref 0–1.5)
BILIRUB SERPL-MCNC: 0.4 MG/DL (ref 0.2–1.2)
BUN SERPL-MCNC: 16 MG/DL (ref 6–20)
BUN/CREAT SERPL: 18 (ref 7–25)
CALCIUM SERPL-MCNC: 9.9 MG/DL (ref 8.6–10.5)
CHLORIDE SERPL-SCNC: 100 MMOL/L (ref 98–107)
CO2 SERPL-SCNC: 24.7 MMOL/L (ref 22–29)
CREAT SERPL-MCNC: 0.89 MG/DL (ref 0.76–1.27)
DIFFERENTIAL COMMENT: ABNORMAL
EOSINOPHIL # BLD AUTO: (no result) 10*3/UL
EOSINOPHIL NFR BLD AUTO: (no result) %
ERYTHROCYTE [DISTWIDTH] IN BLOOD BY AUTOMATED COUNT: 12.7 % (ref 12.3–15.4)
GLOBULIN SER CALC-MCNC: 3.1 GM/DL
GLUCOSE SERPL-MCNC: 83 MG/DL (ref 65–99)
HCT VFR BLD AUTO: 41.1 % (ref 37.5–51)
HGB BLD-MCNC: 13.4 G/DL (ref 13–17.7)
LYMPHOCYTES # BLD AUTO: (no result) 10*3/UL
LYMPHOCYTES # BLD MANUAL: 4.04 10*3/MM3 (ref 0.7–3.1)
LYMPHOCYTES NFR BLD AUTO: (no result) %
LYMPHOCYTES NFR BLD MANUAL: 41 % (ref 19.6–45.3)
MCH RBC QN AUTO: 30.5 PG (ref 26.6–33)
MCHC RBC AUTO-ENTMCNC: 32.6 G/DL (ref 31.5–35.7)
MCV RBC AUTO: 93.4 FL (ref 79–97)
MONOCYTES # BLD MANUAL: 0.99 10*3/MM3 (ref 0.1–0.9)
MONOCYTES NFR BLD AUTO: (no result) %
MONOCYTES NFR BLD MANUAL: 10 % (ref 5–12)
NEUTROPHILS # BLD MANUAL: 4.53 10*3/MM3 (ref 1.7–7)
NEUTROPHILS NFR BLD AUTO: (no result) %
NEUTROPHILS NFR BLD MANUAL: 46 % (ref 42.7–76)
PLATELET # BLD AUTO: 236 10*3/MM3 (ref 140–450)
PLATELET BLD QL SMEAR: ABNORMAL
POTASSIUM SERPL-SCNC: 4.3 MMOL/L (ref 3.5–5.2)
PROT SERPL-MCNC: 7.8 G/DL (ref 6–8.5)
RBC # BLD AUTO: 4.4 10*6/MM3 (ref 4.14–5.8)
RBC MORPH BLD: ABNORMAL
SODIUM SERPL-SCNC: 138 MMOL/L (ref 136–145)
WBC # BLD AUTO: 9.85 10*3/MM3 (ref 3.4–10.8)

## 2019-05-08 NOTE — TELEPHONE ENCOUNTER
Sent in just 30.  Advised patient he must keep this appt, or there would be no further refills.  Patient voiced understanding.    ----- Message from Myrtle Murillo sent at 11/28/2018  9:54 AM EST -----  Regarding: script denial   Contact: [phone]  LOU    [facility]    ZOLOFT SCRIPT DENIED;    PT MADE APPT FOR Thursday December 6TH.  CAN HE GET ENOUGH TO GET HIM TO APPT      Spouse

## 2019-05-09 ENCOUNTER — TELEPHONE (OUTPATIENT)
Dept: INTERNAL MEDICINE | Facility: CLINIC | Age: 44
End: 2019-05-09

## 2019-05-09 NOTE — TELEPHONE ENCOUNTER
LVM with details per patients HIPAA consent.    ----- Message from NADJA Valdez sent at 5/9/2019  7:48 AM EDT -----  His liver function is going up due to mono. Repeat a CMP in 4 weeks.

## 2020-02-19 DIAGNOSIS — F41.1 GENERALIZED ANXIETY DISORDER: ICD-10-CM

## 2020-07-27 ENCOUNTER — E-VISIT (OUTPATIENT)
Dept: FAMILY MEDICINE CLINIC | Facility: TELEHEALTH | Age: 45
End: 2020-07-27

## 2020-07-30 ENCOUNTER — TELEPHONE (OUTPATIENT)
Dept: INTERNAL MEDICINE | Facility: CLINIC | Age: 45
End: 2020-07-30

## 2020-07-30 DIAGNOSIS — J06.9 VIRAL URI WITH COUGH: Primary | ICD-10-CM

## 2020-07-30 NOTE — TELEPHONE ENCOUNTER
He tested positive for Covid at Urgent care and should have been called with results from testing site.    CXR Pa and Lateral. Must make hospital aware of positive chest for PPE precautions.     Tussionex   Si ml po q12hrs prn cough  Disp #4oz

## 2020-07-30 NOTE — TELEPHONE ENCOUNTER
Patient calling and believes he is coming down with pneumonia and would like an antibiotic called in.   He reports the following symptoms:  - cough  - no taste  - no smell  - vomiting  - headache  - chest pressure  - lungs have a burning sensation  Bria Conrad on [address].    Patient can be reached at [phone].

## 2020-08-27 ENCOUNTER — TELEMEDICINE (OUTPATIENT)
Dept: INTERNAL MEDICINE | Facility: CLINIC | Age: 45
End: 2020-08-27

## 2020-08-27 DIAGNOSIS — R06.09 DYSPNEA ON EXERTION: ICD-10-CM

## 2020-08-27 DIAGNOSIS — U07.1 COVID-19: Primary | ICD-10-CM

## 2020-08-27 PROCEDURE — 99213 OFFICE O/P EST LOW 20 MIN: CPT | Performed by: NURSE PRACTITIONER

## 2020-08-27 NOTE — PROGRESS NOTES
"Covid 19 and residual lung issues    Subjective     Perez Beasley is a 44 y.o. male being seen for an acute visit for Covid 19. He was seen in urgent care 7- for headache, fever, and URI symptoms. He tesetd positive for SARS-CoV-2 via nasopharynx swab. His symptoms progressed, and He was sent to the ER 7- for increasing cough, CP, and SOA . An ECG was negative. CT chest showed mild adenopathy and ground glass opacity. CBC showed hgb 12.3. Strep swab was negative. He is still feeling SOA with any exertion, dry cough, and has poor exercise tolerance, stating \"I was running marathons last year, now I cannot even walk up the steps.\" He is using a Pulse ox at home for monitoring, Oxygen running 90-94%. He is using albuterol as needed with temporary relief. Symptoms worsen with any exertion and at night.     This patient has consented to a video visit due to Covid 19 pandemic in accordance with current CDC guidelines.    History of Present Illness     No Known Allergies      Current Outpatient Medications:   •  HYDROcod Polst-CPM Polst ER (Tussionex Pennkinetic ER) 10-8 MG/5ML ER suspension, Take 5 mL by mouth Every 12 (Twelve) Hours As Needed for Cough., Disp: 115 mL, Rfl: 0  •  sertraline (ZOLOFT) 50 MG tablet, TAKE 1 TABLET BY MOUTH DAILY, Disp: 90 tablet, Rfl: 3    The following portions of the patient's history were reviewed and updated as appropriate: allergies, current medications, past family history, past medical history, past social history, past surgical history and problem list.    Review of Systems   Constitutional: Negative.  Negative for fatigue and fever.   Eyes: Negative.    Respiratory: Positive for cough, shortness of breath and wheezing.    Cardiovascular: Negative for chest pain, palpitations and leg swelling.   Gastrointestinal: Negative.    Endocrine: Negative for cold intolerance.   Musculoskeletal: Negative.    Skin: Negative.    Allergic/Immunologic: Positive for environmental " allergies.   Neurological: Negative.    Hematological: Negative.  Negative for adenopathy. Does not bruise/bleed easily.   Psychiatric/Behavioral: Negative.  Negative for agitation and confusion.   All other systems reviewed and are negative.      Assessment     Physical Exam   Constitutional: He is oriented to person, place, and time.   Neurological: He is alert and oriented to person, place, and time.   Psychiatric: He has a normal mood and affect. His behavior is normal.       Plan     His ER and Urgent care records were reviewed with the patient from 7-23-20 and 7-303-2020     Perez was seen today for cough and shortness of breath.    Diagnoses and all orders for this visit:    COVID-19  -     mometasone-formoterol (DULERA 200) 200-5 MCG/ACT inhaler; Inhale 2 puffs 2 (Two) Times a Day.  -     Ambulatory Referral to Pulmonology    Dyspnea on exertion  -     mometasone-formoterol (DULERA 200) 200-5 MCG/ACT inhaler; Inhale 2 puffs 2 (Two) Times a Day.  -     Ambulatory Referral to Pulmonology        Most likely reactive airway post viral syndrome. Refer to Pulmonary due to unknown long term lung affects of Covid 19. Consider PFTs. Will treat with Dulera for symptom relief.

## 2021-03-04 DIAGNOSIS — F41.1 GENERALIZED ANXIETY DISORDER: ICD-10-CM

## 2021-03-25 ENCOUNTER — OFFICE VISIT (OUTPATIENT)
Dept: INTERNAL MEDICINE | Facility: CLINIC | Age: 46
End: 2021-03-25

## 2021-03-25 VITALS
TEMPERATURE: 97.5 F | SYSTOLIC BLOOD PRESSURE: 124 MMHG | HEIGHT: 70 IN | RESPIRATION RATE: 16 BRPM | DIASTOLIC BLOOD PRESSURE: 82 MMHG | OXYGEN SATURATION: 100 % | WEIGHT: 197 LBS | BODY MASS INDEX: 28.2 KG/M2 | HEART RATE: 61 BPM

## 2021-03-25 DIAGNOSIS — Z11.4 ENCOUNTER FOR SCREENING FOR HIV: ICD-10-CM

## 2021-03-25 DIAGNOSIS — F41.1 GAD (GENERALIZED ANXIETY DISORDER): ICD-10-CM

## 2021-03-25 DIAGNOSIS — M72.0 DUPUYTREN CONTRACTURE: ICD-10-CM

## 2021-03-25 DIAGNOSIS — F41.1 GENERALIZED ANXIETY DISORDER: ICD-10-CM

## 2021-03-25 DIAGNOSIS — Z00.00 WELL ADULT EXAM: Primary | ICD-10-CM

## 2021-03-25 DIAGNOSIS — Z11.59 ENCOUNTER FOR HCV SCREENING TEST FOR LOW RISK PATIENT: ICD-10-CM

## 2021-03-25 PROCEDURE — 99396 PREV VISIT EST AGE 40-64: CPT | Performed by: INTERNAL MEDICINE

## 2021-03-25 NOTE — PROGRESS NOTES
"Chief Complaint   Patient presents with   • knot in palm     left hand        Subjective   Perez Beasley is a 45 y.o. male.     Has had left hand knot on palmar area for last 6 months, seems to be getting larger, no finger sticking, locking, catching; no weakness, no pain, no swelling; no significant injury or trauma       The following portions of the patient's history were reviewed and updated as appropriate: allergies, current medications, past family history, past medical history, past social history, past surgical history, and problem list.    Review of Systems   Constitutional: Negative for chills, fever, unexpected weight gain and unexpected weight loss.   HENT: Negative for congestion, rhinorrhea and sinus pressure.    Eyes: Negative for blurred vision and visual disturbance.   Respiratory: Negative for cough and shortness of breath.    Cardiovascular: Negative for chest pain, palpitations and leg swelling.   Gastrointestinal: Negative for abdominal pain and nausea.   Endocrine: Negative for cold intolerance and heat intolerance.   Genitourinary: Negative for dysuria and frequency.   Musculoskeletal: Negative for arthralgias and joint swelling.   Neurological: Negative for weakness and confusion.   Psychiatric/Behavioral: Negative for depressed mood. The patient is not nervous/anxious.          Objective   Body mass index is 28.27 kg/m².   Vitals:    03/25/21 1033   BP: 124/82   BP Location: Left arm   Patient Position: Sitting   Cuff Size: Adult   Pulse: 61   Resp: 16   Temp: 97.5 °F (36.4 °C)   SpO2: 100%   Weight: 89.4 kg (197 lb)   Height: 177.8 cm (70\")        Physical Exam  Vitals and nursing note reviewed.   Constitutional:       General: He is not in acute distress.     Appearance: Normal appearance.   HENT:      Head: Normocephalic and atraumatic.      Right Ear: Tympanic membrane, ear canal and external ear normal.      Left Ear: Tympanic membrane, ear canal and external ear normal.      Nose: " Nose normal.      Mouth/Throat:      Mouth: Mucous membranes are moist.      Pharynx: Oropharynx is clear.   Eyes:      Extraocular Movements: Extraocular movements intact.      Conjunctiva/sclera: Conjunctivae normal.      Pupils: Pupils are equal, round, and reactive to light.   Cardiovascular:      Rate and Rhythm: Normal rate and regular rhythm.      Pulses: Normal pulses.      Heart sounds: Normal heart sounds. No murmur heard.   No gallop.    Pulmonary:      Effort: Pulmonary effort is normal.      Breath sounds: Normal breath sounds.   Abdominal:      General: Abdomen is flat. Bowel sounds are normal. There is no distension.      Palpations: Abdomen is soft. There is no mass.      Tenderness: There is no abdominal tenderness.   Musculoskeletal:         General: No swelling. Normal range of motion.      Cervical back: Normal range of motion and neck supple.      Comments: Left hand with palmar contracture, full rom without pain   Skin:     General: Skin is warm and dry.      Findings: No rash.   Neurological:      General: No focal deficit present.      Mental Status: He is alert and oriented to person, place, and time. Mental status is at baseline.   Psychiatric:         Mood and Affect: Mood normal.         Behavior: Behavior normal.           Current Outpatient Medications:   •  sertraline (ZOLOFT) 50 MG tablet, Take 1 tablet by mouth Daily., Disp: 90 tablet, Rfl: 2     Lab Results   Component Value Date    TSH 2.48 11/30/2018        Health Maintenance   Topic Date Due   • TDAP/TD VACCINES (1 - Tdap) 03/25/2021 (Originally 11/26/1994)   • COLONOSCOPY  03/25/2031   • INFLUENZA VACCINE  Completed          There is no immunization history on file for this patient.    Assessment/Plan   Diagnoses and all orders for this visit:    1. Well adult exam (Primary)  -     Ambulatory Referral For Screening Colonoscopy  -     Comprehensive Metabolic Panel  -     Lipid Panel  -     Hemoglobin A1c  -     CBC &  Differential    2. TORSTEN (generalized anxiety disorder)  -     sertraline (ZOLOFT) 50 MG tablet; Take 1 tablet by mouth Daily.  Dispense: 90 tablet; Refill: 2  - continue outside time, exercise, therapy  - discussed risks and benefits    3. Dupuytren contracture  - continue supportive care, stretching; discussed bracing, injections, surgical management options  - consider further management pending    4. Encounter for screening for HIV  -     HIV-1/O/2 Ag/Ab w Reflex    5. Encounter for HCV screening test for low risk patient  -     Hepatitis C antibody    6. Generalized anxiety disorder  -     sertraline (ZOLOFT) 50 MG tablet; Take 1 tablet by mouth Daily.  Dispense: 90 tablet; Refill: 2           Well adult exam  - labs checked and evaluated    Colonoscopy - scheduling now, routine screening  Prostate - at 50  Glaucoma - yearly  AAA - na, never smoker  Lung cancer - na never smoker  HIV - checking  HCV - checking  DM - checking  HLD - checking  Smoking - na never smoker  Depression - no, TORSTEN well managed with meds  Vaccines - tdap when completes covid series spring 2021  Falls - no issues  Alcohol Screening - no issues    Discussed mental health, sexual health, substance use, abuse, anticipatory guidance given.      Return in about 6 months (around 9/25/2021) for Recheck.     Kyle Arias MD  [facility]  Internal Medicine and Pediatrics  Phone: [phone]  Fax: [phone]

## 2021-03-26 LAB
ALBUMIN SERPL-MCNC: 4.5 G/DL (ref 3.5–5.2)
ALBUMIN/GLOB SERPL: 2 G/DL
ALP SERPL-CCNC: 67 U/L (ref 39–117)
ALT SERPL-CCNC: 16 U/L (ref 1–41)
AST SERPL-CCNC: 15 U/L (ref 1–40)
BASOPHILS # BLD AUTO: 0.04 10*3/MM3 (ref 0–0.2)
BASOPHILS NFR BLD AUTO: 1.1 % (ref 0–1.5)
BILIRUB SERPL-MCNC: 0.5 MG/DL (ref 0–1.2)
BUN SERPL-MCNC: 13 MG/DL (ref 6–20)
BUN/CREAT SERPL: 11.7 (ref 7–25)
CALCIUM SERPL-MCNC: 9.8 MG/DL (ref 8.6–10.5)
CHLORIDE SERPL-SCNC: 105 MMOL/L (ref 98–107)
CHOLEST SERPL-MCNC: 168 MG/DL (ref 0–200)
CO2 SERPL-SCNC: 26.2 MMOL/L (ref 22–29)
CREAT SERPL-MCNC: 1.11 MG/DL (ref 0.76–1.27)
EOSINOPHIL # BLD AUTO: 0.06 10*3/MM3 (ref 0–0.4)
EOSINOPHIL NFR BLD AUTO: 1.6 % (ref 0.3–6.2)
ERYTHROCYTE [DISTWIDTH] IN BLOOD BY AUTOMATED COUNT: 12.3 % (ref 12.3–15.4)
GLOBULIN SER CALC-MCNC: 2.2 GM/DL
GLUCOSE SERPL-MCNC: 92 MG/DL (ref 65–99)
HBA1C MFR BLD: 5.2 % (ref 4.8–5.6)
HCT VFR BLD AUTO: 41.1 % (ref 37.5–51)
HCV AB S/CO SERPL IA: <0.1 S/CO RATIO (ref 0–0.9)
HDLC SERPL-MCNC: 58 MG/DL (ref 40–60)
HGB BLD-MCNC: 13.7 G/DL (ref 13–17.7)
HIV 1+2 AB+HIV1 P24 AG SERPL QL IA: NON REACTIVE
IMM GRANULOCYTES # BLD AUTO: 0.01 10*3/MM3 (ref 0–0.05)
IMM GRANULOCYTES NFR BLD AUTO: 0.3 % (ref 0–0.5)
LDLC SERPL CALC-MCNC: 97 MG/DL (ref 0–100)
LYMPHOCYTES # BLD AUTO: 1.21 10*3/MM3 (ref 0.7–3.1)
LYMPHOCYTES NFR BLD AUTO: 32 % (ref 19.6–45.3)
MCH RBC QN AUTO: 31.1 PG (ref 26.6–33)
MCHC RBC AUTO-ENTMCNC: 33.3 G/DL (ref 31.5–35.7)
MCV RBC AUTO: 93.2 FL (ref 79–97)
MONOCYTES # BLD AUTO: 0.39 10*3/MM3 (ref 0.1–0.9)
MONOCYTES NFR BLD AUTO: 10.3 % (ref 5–12)
NEUTROPHILS # BLD AUTO: 2.07 10*3/MM3 (ref 1.7–7)
NEUTROPHILS NFR BLD AUTO: 54.7 % (ref 42.7–76)
NRBC BLD AUTO-RTO: 0 /100 WBC (ref 0–0.2)
PLATELET # BLD AUTO: 253 10*3/MM3 (ref 140–450)
POTASSIUM SERPL-SCNC: 4.7 MMOL/L (ref 3.5–5.2)
PROT SERPL-MCNC: 6.7 G/DL (ref 6–8.5)
RBC # BLD AUTO: 4.41 10*6/MM3 (ref 4.14–5.8)
SODIUM SERPL-SCNC: 140 MMOL/L (ref 136–145)
TRIGL SERPL-MCNC: 68 MG/DL (ref 0–150)
VLDLC SERPL CALC-MCNC: 13 MG/DL (ref 5–40)
WBC # BLD AUTO: 3.78 10*3/MM3 (ref 3.4–10.8)

## 2021-04-01 ENCOUNTER — IMMUNIZATION (OUTPATIENT)
Dept: VACCINE CLINIC | Facility: HOSPITAL | Age: 46
End: 2021-04-01

## 2021-04-01 PROCEDURE — 0001A: CPT | Performed by: OBSTETRICS & GYNECOLOGY

## 2021-04-01 PROCEDURE — 91300 HC SARSCOV02 VAC 30MCG/0.3ML IM: CPT | Performed by: OBSTETRICS & GYNECOLOGY

## 2021-04-26 ENCOUNTER — IMMUNIZATION (OUTPATIENT)
Dept: VACCINE CLINIC | Facility: HOSPITAL | Age: 46
End: 2021-04-26

## 2021-04-26 PROCEDURE — 91300 HC SARSCOV02 VAC 30MCG/0.3ML IM: CPT | Performed by: OBSTETRICS & GYNECOLOGY

## 2021-04-26 PROCEDURE — 0002A: CPT | Performed by: OBSTETRICS & GYNECOLOGY

## 2022-06-28 DIAGNOSIS — F41.1 GAD (GENERALIZED ANXIETY DISORDER): ICD-10-CM

## 2022-06-28 DIAGNOSIS — F41.1 GENERALIZED ANXIETY DISORDER: ICD-10-CM

## 2023-01-03 ENCOUNTER — OFFICE VISIT (OUTPATIENT)
Dept: ORTHOPEDIC SURGERY | Facility: CLINIC | Age: 48
End: 2023-01-03
Payer: COMMERCIAL

## 2023-01-03 VITALS — BODY MASS INDEX: 30.49 KG/M2 | HEIGHT: 70 IN | WEIGHT: 213 LBS | TEMPERATURE: 98.7 F

## 2023-01-03 DIAGNOSIS — S83.281A OTHER TEAR OF LATERAL MENISCUS OF RIGHT KNEE AS CURRENT INJURY, INITIAL ENCOUNTER: Primary | ICD-10-CM

## 2023-01-03 PROCEDURE — 73562 X-RAY EXAM OF KNEE 3: CPT | Performed by: ORTHOPAEDIC SURGERY

## 2023-01-03 PROCEDURE — 99203 OFFICE O/P NEW LOW 30 MIN: CPT | Performed by: ORTHOPAEDIC SURGERY

## 2023-01-04 NOTE — PROGRESS NOTES
New Knee      Patient: Perez Beasley        YOB: 1975    Medical Record Number: [id number]        Chief Complaints: Right knee pain    History of Present Illness: This is a 47-year-old male who presents complaining of right knee pain he states about a year ago he was having some intermittent problems with in the past with catching that on Saturday his knee popped and essentially locked he has pain anteriorly also pain medially and laterally he has any he has pain with any kind of weightbearing and twist pain is severe with positions his past medical history is unremarkable he is an avid runner        Allergies: No Known Allergies    Medications:   Home Medications:  Current Outpatient Medications on File Prior to Visit   Medication Sig   • sertraline (ZOLOFT) 50 MG tablet TAKE ONE TABLET BY MOUTH DAILY     No current facility-administered medications on file prior to visit.     Current Medications:  Scheduled Meds:  Continuous Infusions:No current facility-administered medications for this visit.    PRN Meds:.    Past Medical History:   Diagnosis Date   • Anxiety    • Herniated lumbar intervertebral disc     Had a series of epidurals and PT 2018        Past Surgical History:   Procedure Laterality Date   • DENTAL PROCEDURE          Social History     Occupational History   • Not on file   Tobacco Use   • Smoking status: Former     Packs/day: 0.50     Years: 2.00     Pack years: 1.00     Types: Cigarettes     Start date: 1997     Quit date: 10/1/1999     Years since quittin.2   • Smokeless tobacco: Never   Substance and Sexual Activity   • Alcohol use: Yes     Alcohol/week: 10.0 standard drinks     Types: 10 Cans of beer per week   • Drug use: Never   • Sexual activity: Yes     Partners: Female     Birth control/protection: Vasectomy      Social History     Social History Narrative    He is working full time at [employer]        Family History   Problem Relation Age of Onset   • No Known  Problems Mother    • No Known Problems Other              Review of Systems:     Review of Systems      Physical Exam: 47 y.o. male  General Appearance:    Alert, cooperative, in no acute distress                   Vitals:    01/03/23 1604   Temp: 98.7 °F (37.1 °C)   Weight: 96.6 kg (213 lb)   Height: 177.8 cm (70\")      Patient is alert and read ×3 no acute distress appears her above-listed at height weight and age.  Affect is normal respiratory rate is normal unlabored. Heart rate regular rate rhythm, sclera, dentition and hearing are normal for the purpose of this exam.        Ortho Exam Physical exam of the right knee reveals no effusion no redness.  The patient does have tenderness about the lateral joint line.  No tenderness about the medial joint line.  A negative bounce home and a positive lateral Maria Antonia.    Patient has a stable ligamentous exam.  The patient has a negative Lachman and negative anterior drawer and a negative pivot shift.  Quads are reasonable and symmetric bilaterally.  Calf is soft and nontender.  There is no overlying skin changes no lymphedema lymphadenopathy.  Patient has good hip range of motion full symmetric and asymptomatic and a normal ankle exam      Procedures             Radiology:   AP, Lateral and merchant views of the right knee  were ordered/reviewed to evauateknee pain.  No comparative films these are normal  Imaging Results (Most Recent)     Procedure Component Value Units Date/Time    XR Knee 3 View Right [[id number]] Resulted: 01/03/23 1712     Updated: 01/03/23 1712    Impression:      Ordering physician's impression is located in the Encounter Note dated 01/03/23. X-ray performed in the DR room.         Assessment/Plan: Right knee pain he has a very positive Maria Antonia medially and laterally quite confident he has medial meniscus tear or loose body and I would lean more towards a meniscus tear.  Is pretty miserable he does have episodes of locking and catching I think  therapy would only make this worse and plan is to proceed with an MRI

## 2023-01-10 ENCOUNTER — DOCUMENTATION (OUTPATIENT)
Dept: ORTHOPEDIC SURGERY | Facility: CLINIC | Age: 48
End: 2023-01-10
Payer: COMMERCIAL

## 2023-01-10 NOTE — PROGRESS NOTES
I spoke to patient about his MRI he has anterior lateral tibial fracture with associated edema he sits at work so I asked him to  some crutches and take the majority of his weight off this knee I will see him back in 3 weeks with a repeat x-ray

## 2023-04-20 NOTE — TELEPHONE ENCOUNTER
Since talking to Edith, I found out that they possibly wouldn't see him since he has Covid and he hasnt had a follow up visit with you for the Covid, she says do not place Order but to just send him to the ER to be seen and they will do their own CX that way. They won't turn him away for not seeing you first. I advised pt and he will go to the ER and he said he will also let them know that he has Covid.    SUBJECTIVE:     OVERNIGHT EVENTS: none    Vital Signs Last 24 Hrs  T(C): 36.8 (20 Apr 2023 12:48), Max: 37 (19 Apr 2023 20:41)  T(F): 98.3 (20 Apr 2023 12:48), Max: 98.6 (19 Apr 2023 20:41)  HR: 82 (20 Apr 2023 12:48) (79 - 88)  BP: 109/78 (20 Apr 2023 12:48) (109/78 - 138/86)  BP(mean): --  RR: 18 (20 Apr 2023 12:48) (17 - 18)  SpO2: 99% (20 Apr 2023 12:48) (97% - 100%)    Parameters below as of 20 Apr 2023 12:48  Patient On (Oxygen Delivery Method): room air        PHYSICAL EXAM:    Constitutional: No Acute Distress     Neurological: Awake alert Ox3, Speech clear Following Commands, Moving all Extremities 5/5 B/L LE below knee decreased sensation light touch .     Pulmonary: Clear to Auscultation,    Cardiovascular: S1, S2, Regular rate and rhythm     Gastrointestinal: Soft, Non-tender, Non-distended     Extremities: No calf tenderness       LABS:                        12.4   12.40 )-----------( 296      ( 18 Apr 2023 15:43 )             39.0    04-18    141  |  103  |  12  ----------------------------<  112<H>  3.6   |  27  |  0.53    Ca    9.1      18 Apr 2023 15:42  Phos  5.7     04-19  Mg     1.9     04-18    TPro  6.5  /  Alb  3.7  /  TBili  0.2  /  DBili  x   /  AST  6<L>  /  ALT  15  /  AlkPhos  44  04-18  PT/INR - ( 19 Apr 2023 06:19 )   PT: 11.2 sec;   INR: 0.97 ratio   PTT:29.0 sec        IMAGING:         MEDICATIONS:    acetaminophen     Tablet .. 650 milliGRAM(s) Oral every 6 hours PRN Temp greater or equal to 38C (100.4F), Mild Pain (1 - 3)  methocarbamol 500 milliGRAM(s) Oral every 8 hours PRN Muscle Spasm  oxyCODONE    IR 5 milliGRAM(s) Oral every 4 hours PRN Moderate Pain (4 - 6)  oxyCODONE    IR 10 milliGRAM(s) Oral every 4 hours PRN Severe Pain (7 - 10)  NIFEdipine XL 60 milliGRAM(s) Oral daily  albuterol    90 MICROgram(s) HFA Inhaler 2 Puff(s) Inhalation every 6 hours PRN for shortness of breath and/or wheezing  bisacodyl 5 milliGRAM(s) Oral daily PRN Constipation  pantoprazole    Tablet 40 milliGRAM(s) Oral before breakfast  polyethylene glycol 3350 17 Gram(s) Oral daily PRN Constipation  senna 2 Tablet(s) Oral at bedtime  enoxaparin Injectable 40 milliGRAM(s) SubCutaneous <User Schedule>  multivitamin 1 Tablet(s) Oral daily      DIET: